# Patient Record
Sex: MALE | Race: BLACK OR AFRICAN AMERICAN | Employment: OTHER
[De-identification: names, ages, dates, MRNs, and addresses within clinical notes are randomized per-mention and may not be internally consistent; named-entity substitution may affect disease eponyms.]

---

## 2024-04-02 ENCOUNTER — HOSPITAL ENCOUNTER (EMERGENCY)
Facility: HOSPITAL | Age: 24
Discharge: HOME | End: 2024-04-02
Payer: OTHER GOVERNMENT

## 2024-04-02 VITALS
SYSTOLIC BLOOD PRESSURE: 167 MMHG | BODY MASS INDEX: 38.51 KG/M2 | DIASTOLIC BLOOD PRESSURE: 103 MMHG | WEIGHT: 260 LBS | OXYGEN SATURATION: 100 % | TEMPERATURE: 96.8 F | RESPIRATION RATE: 18 BRPM | HEART RATE: 72 BPM | HEIGHT: 69 IN

## 2024-04-02 DIAGNOSIS — L02.91 ABSCESS: Primary | ICD-10-CM

## 2024-04-02 PROCEDURE — 99283 EMERGENCY DEPT VISIT LOW MDM: CPT

## 2024-04-02 PROCEDURE — 10061 I&D ABSCESS COMP/MULTIPLE: CPT

## 2024-04-02 RX ORDER — CEPHALEXIN 500 MG/1
500 CAPSULE ORAL 3 TIMES DAILY
Qty: 21 CAPSULE | Refills: 0 | Status: SHIPPED | OUTPATIENT
Start: 2024-04-02 | End: 2024-04-09

## 2024-04-02 NOTE — ED PROVIDER NOTES
benefits, and alternatives were discussed: yes      Risks discussed:  Bleeding, incomplete drainage and pain    Alternatives discussed:  Alternative treatment and referral  Universal protocol:     Patient identity confirmed:  Verbally with patient  Location:     Type:  Abscess    Size:  3 cm    Location:  Lower extremity    Lower extremity location: inner thigh.  Pre-procedure details:     Skin preparation:  Povidone-iodine  Sedation:     Sedation type:  None  Anesthesia:     Anesthesia method:  Local infiltration    Local anesthetic:  Lidocaine 1% w/o epi  Procedure type:     Complexity:  Complex  Procedure details:     Ultrasound guidance: no      Needle aspiration: no      Incision types:  Stab incision    Incision depth:  Dermal    Wound management:  Probed and deloculated and irrigated with saline    Drainage:  Bloody and purulent    Drainage amount:  Moderate    Wound treatment:  Drain placed    Packing materials:   in gauze    Amount \":  4 cm  Post-procedure details:     Procedure completion:  Tolerated well, no immediate complications           CRITICAL CARE TIME       EMERGENCY DEPARTMENT COURSE and DIFFERENTIAL DIAGNOSIS/MDM   Vitals:    Vitals:    24 0829   BP: (!) 167/103   Pulse: 72   Resp: 18   Temp: 96.8 °F (36 °C)   SpO2: 100%   Weight: 117.9 kg (260 lb)   Height: 1.753 m (5' 9\")       Patient was given the following medications:  Medications - No data to display      CONSULTS: (Who and What was discussed)  None    Chronic Conditions: None    Social Determinants affecting Dx or Tx: none       Records Reviewed (source and summary): Old medical records.  Nursing notes.    ED COURSE       Medial Decision Makin-year-old male presenting to the emergency department with small abscess to the right inner thigh    DDX to include but not limited to:  Abscess, cellulitis, folliculitis, sebaceous cyst    Patient is well-appearing in no acute distress, he has a 3 cm tender and fluctuant abscess

## 2024-04-02 NOTE — DISCHARGE INSTRUCTIONS
Warm washcloth compresses 3 times a day, take antibiotics as prescribed, you can pull the packing out in 2 days.  If it falls out on its own, do not replace it.  Return to ER if you develop any worsening symptoms to include fever, increased pain or redness

## 2024-06-04 ENCOUNTER — HOSPITAL ENCOUNTER (EMERGENCY)
Facility: HOSPITAL | Age: 24
Discharge: HOME | End: 2024-06-04
Attending: EMERGENCY MEDICINE
Payer: OTHER GOVERNMENT

## 2024-06-04 VITALS
BODY MASS INDEX: 38.08 KG/M2 | DIASTOLIC BLOOD PRESSURE: 92 MMHG | SYSTOLIC BLOOD PRESSURE: 131 MMHG | WEIGHT: 266 LBS | RESPIRATION RATE: 19 BRPM | TEMPERATURE: 97.9 F | HEART RATE: 91 BPM | HEIGHT: 70 IN | OXYGEN SATURATION: 98 %

## 2024-06-04 DIAGNOSIS — S39.012A LUMBOSACRAL STRAIN, INITIAL ENCOUNTER: Primary | ICD-10-CM

## 2024-06-04 PROCEDURE — 96372 THER/PROPH/DIAG INJ SC/IM: CPT

## 2024-06-04 PROCEDURE — 6360000002 HC RX W HCPCS: Performed by: EMERGENCY MEDICINE

## 2024-06-04 PROCEDURE — 99284 EMERGENCY DEPT VISIT MOD MDM: CPT

## 2024-06-04 RX ORDER — METHOCARBAMOL 750 MG/1
750 TABLET, FILM COATED ORAL 3 TIMES DAILY PRN
Qty: 10 TABLET | Refills: 0 | Status: SHIPPED | OUTPATIENT
Start: 2024-06-04

## 2024-06-04 RX ORDER — KETOROLAC TROMETHAMINE 15 MG/ML
30 INJECTION, SOLUTION INTRAMUSCULAR; INTRAVENOUS ONCE
Status: COMPLETED | OUTPATIENT
Start: 2024-06-04 | End: 2024-06-04

## 2024-06-04 RX ORDER — NAPROXEN 500 MG/1
500 TABLET ORAL 2 TIMES DAILY
Qty: 10 TABLET | Refills: 0 | Status: SHIPPED | OUTPATIENT
Start: 2024-06-04 | End: 2024-06-09

## 2024-06-04 RX ADMIN — KETOROLAC TROMETHAMINE 30 MG: 15 INJECTION, SOLUTION INTRAMUSCULAR; INTRAVENOUS at 08:16

## 2024-06-04 ASSESSMENT — PAIN SCALES - GENERAL: PAINLEVEL_OUTOF10: 8

## 2024-06-04 ASSESSMENT — LIFESTYLE VARIABLES
HOW MANY STANDARD DRINKS CONTAINING ALCOHOL DO YOU HAVE ON A TYPICAL DAY: 3 OR 4
HOW OFTEN DO YOU HAVE A DRINK CONTAINING ALCOHOL: MONTHLY OR LESS

## 2024-06-04 NOTE — ED PROVIDER NOTES
[facility] EMERGENCY DEPT  EMERGENCY DEPARTMENT HISTORY AND PHYSICAL EXAM      Date: 6/4/2024  Patient Name: Marianna Arambula  MRN: [id number]  Birthdate 2000  Date of evaluation: 6/4/2024  Provider: Willow Correa MD   Note Started: 8:12 AM EDT 6/4/24    HISTORY OF PRESENT ILLNESS     Chief Complaint   Patient presents with    Back Pain       History Provided By: Patient    HPI: Marianna Arambula is a 23 y.o. male no significant past medical history presents for evaluation of low back pain.  He reports it typically starts with prolonged bending over or physical activity.  Started when he was running today.  States he has intermittent low back pain.  He previously started physical therapy was but was unable to completed due to his schedule.  He denies any weakness, trauma, IVDA, saddle anesthesia or any other concerning features.  Has not take any medications today.    PAST MEDICAL HISTORY   Past Medical History:  History reviewed. No pertinent past medical history.    Past Surgical History:  History reviewed. No pertinent surgical history.    Family History:  History reviewed. No pertinent family history.    Social History:  Social History     Tobacco Use    Smoking status: Every Day     Types: Cigarettes, E-Cigarettes   Vaping Use    Vaping Use: Every day    Substances: Nicotine   Substance Use Topics    Alcohol use: Yes    Drug use: Never       Allergies:  No Known Allergies    PCP: Unknown, Provider, APRN - NP    Current Meds:   No current facility-administered medications for this encounter.     Current Outpatient Medications   Medication Sig Dispense Refill    naproxen (NAPROSYN) 500 MG tablet Take 1 tablet by mouth 2 times daily for 5 days 10 tablet 0    methocarbamol (ROBAXIN-750) 750 MG tablet Take 1 tablet by mouth 3 times daily as needed (muscle spasm) 10 tablet 0       Social Determinants of Health:   Social Determinants of Health     Tobacco Use: High Risk (6/4/2024)    Patient History     Smoking Tobacco Use:

## 2024-06-04 NOTE — ED NOTES
Pt stated that he has chronic lower back pain that has flared up. Pt stated that he has had this issue for the last year. Pt stated that he has been through a variety of tests and that they weren't able to tell him anything. Pt stated that the pain is worse with movement. Pt stated that the pain began while he was on a run this morning. Pt is alert and oriented on arrival. Pt stated that he didn't take anything for pain pta. Will continue plan of care

## 2024-08-10 ENCOUNTER — HOSPITAL ENCOUNTER (EMERGENCY)
Facility: HOSPITAL | Age: 24
Discharge: HOME | End: 2024-08-10
Payer: OTHER GOVERNMENT

## 2024-08-10 VITALS
DIASTOLIC BLOOD PRESSURE: 83 MMHG | HEIGHT: 70 IN | SYSTOLIC BLOOD PRESSURE: 133 MMHG | TEMPERATURE: 98 F | BODY MASS INDEX: 38.08 KG/M2 | RESPIRATION RATE: 14 BRPM | HEART RATE: 94 BPM | OXYGEN SATURATION: 98 % | WEIGHT: 266 LBS

## 2024-08-10 DIAGNOSIS — K12.2 UVULITIS: ICD-10-CM

## 2024-08-10 DIAGNOSIS — J02.0 STREP PHARYNGITIS: Primary | ICD-10-CM

## 2024-08-10 LAB — S PYO AG THROAT QL: POSITIVE

## 2024-08-10 PROCEDURE — 6370000000 HC RX 637 (ALT 250 FOR IP): Performed by: NURSE PRACTITIONER

## 2024-08-10 PROCEDURE — 99283 EMERGENCY DEPT VISIT LOW MDM: CPT

## 2024-08-10 PROCEDURE — 87880 STREP A ASSAY W/OPTIC: CPT

## 2024-08-10 PROCEDURE — 6360000002 HC RX W HCPCS: Performed by: NURSE PRACTITIONER

## 2024-08-10 RX ORDER — DEXAMETHASONE 4 MG/1
8 TABLET ORAL
Status: COMPLETED | OUTPATIENT
Start: 2024-08-10 | End: 2024-08-10

## 2024-08-10 RX ORDER — AMOXICILLIN 500 MG/1
500 CAPSULE ORAL 2 TIMES DAILY
Qty: 20 CAPSULE | Refills: 0 | Status: SHIPPED | OUTPATIENT
Start: 2024-08-10 | End: 2024-08-20

## 2024-08-10 RX ORDER — AMOXICILLIN 250 MG/1
500 CAPSULE ORAL
Status: COMPLETED | OUTPATIENT
Start: 2024-08-10 | End: 2024-08-10

## 2024-08-10 RX ORDER — ONDANSETRON 4 MG/1
4 TABLET, ORALLY DISINTEGRATING ORAL
Status: COMPLETED | OUTPATIENT
Start: 2024-08-10 | End: 2024-08-10

## 2024-08-10 RX ORDER — ACETAMINOPHEN 500 MG
1000 TABLET ORAL
Status: COMPLETED | OUTPATIENT
Start: 2024-08-10 | End: 2024-08-10

## 2024-08-10 RX ADMIN — DEXAMETHASONE 8 MG: 4 TABLET ORAL at 13:59

## 2024-08-10 RX ADMIN — ACETAMINOPHEN 1000 MG: 500 TABLET ORAL at 14:00

## 2024-08-10 RX ADMIN — ONDANSETRON 4 MG: 4 TABLET, ORALLY DISINTEGRATING ORAL at 13:59

## 2024-08-10 RX ADMIN — AMOXICILLIN 500 MG: 250 CAPSULE ORAL at 14:00

## 2024-08-10 ASSESSMENT — PAIN - FUNCTIONAL ASSESSMENT: PAIN_FUNCTIONAL_ASSESSMENT: 0-10

## 2024-08-10 ASSESSMENT — PAIN SCALES - GENERAL: PAINLEVEL_OUTOF10: 7

## 2024-08-10 NOTE — ED TRIAGE NOTES
Awoke with  sore throat this am. One episode of N/V.  Moderate swelling and redness noted to back or oral cavity during triage. Voice muffled during conversation. Vitals stable.  Client NAD. AXOX4.

## 2024-08-10 NOTE — DISCHARGE INSTRUCTIONS
Antibiotics as prescribed, make sure to take the full 10 days unless instructed not to by medical professional  May use OTC Tylenol or Motrin according to package directions for fever/pain  May use salt water gargles, warm tea with honey to help with sore throat  Return to care for new or worsening symptoms to include inability to swallow secretions, difficulty breathing or other concerning symptoms.

## 2024-08-10 NOTE — ED PROVIDER NOTES
[facility] EMERGENCY DEPT  EMERGENCY DEPARTMENT ENCOUNTER       Pt Name: Marianna Arambula  MRN: [id number]  Birthdate 2000  Date of evaluation: 8/10/2024  PCP: Unknown, Provider, SCOUT - NP  Note Started: 3:28 PM 8/10/24     CHIEF COMPLAINT       Chief Complaint   Patient presents with    Pharyngitis        HISTORY OF PRESENT ILLNESS: 1 or more elements      History From: Patient  HPI Limitations: None  Chronic Conditions: None  Social Determinants affecting Dx or Tx: None       Marianna Arambula is a 23 y.o. male who presents to ED c/o pharyngitis that began this morning.  Patient denies headache, dizziness, vision changes, chest pain or shortness of breath.  Patient denies difficulty swallowing or managing secretions, does note some hoarseness to voice.  No cough, no congestion.  No known exposure to specific illness.  Patient has not taken any medications prior to arrival.     Nursing Notes were all reviewed and agreed with or any disagreements were addressed in the HPI.    PAST HISTORY     Past Medical History:  No past medical history on file.    Past Surgical History:  No past surgical history on file.    Family History:  No family history on file.    Social History:  Social History     Socioeconomic History    Marital status:    Tobacco Use    Smoking status: Every Day     Types: Cigarettes, E-Cigarettes   Vaping Use    Vaping status: Every Day    Substances: Nicotine   Substance and Sexual Activity    Alcohol use: Yes    Drug use: Never       Allergies:  No Known Allergies    CURRENT MEDICATIONS      No current facility-administered medications for this encounter.     Current Outpatient Medications   Medication Sig Dispense Refill    amoxicillin (AMOXIL) 500 MG capsule Take 1 capsule by mouth 2 times daily for 10 days 20 capsule 0    naproxen (NAPROSYN) 500 MG tablet Take 1 tablet by mouth 2 times daily for 5 days 10 tablet 0    methocarbamol (ROBAXIN-750) 750 MG tablet Take 1 tablet by mouth 3 times daily as